# Patient Record
Sex: MALE | URBAN - METROPOLITAN AREA
[De-identification: names, ages, dates, MRNs, and addresses within clinical notes are randomized per-mention and may not be internally consistent; named-entity substitution may affect disease eponyms.]

---

## 2017-06-26 ENCOUNTER — APPOINTMENT (RX ONLY)
Dept: URBAN - METROPOLITAN AREA CLINIC 77 | Facility: CLINIC | Age: 57
Setting detail: DERMATOLOGY
End: 2017-06-26

## 2017-06-26 DIAGNOSIS — L70.0 ACNE VULGARIS: ICD-10-CM

## 2017-06-26 DIAGNOSIS — L72.0 EPIDERMAL CYST: ICD-10-CM

## 2017-06-26 PROCEDURE — ? PRESCRIPTION

## 2017-06-26 PROCEDURE — ? COUNSELING

## 2017-06-26 PROCEDURE — 99203 OFFICE O/P NEW LOW 30 MIN: CPT

## 2017-06-26 PROCEDURE — ? TREATMENT REGIMEN

## 2017-06-26 RX ORDER — TAZAROTENE 1 MG/G
AEROSOL, FOAM TOPICAL
Qty: 1 | Refills: 3 | Status: ERX | COMMUNITY
Start: 2017-06-26

## 2017-06-26 RX ADMIN — TAZAROTENE: 1 AEROSOL, FOAM TOPICAL at 21:31

## 2017-06-26 ASSESSMENT — LOCATION SIMPLE DESCRIPTION DERM
LOCATION SIMPLE: LEFT EAR
LOCATION SIMPLE: LEFT FOREHEAD

## 2017-06-26 ASSESSMENT — LOCATION DETAILED DESCRIPTION DERM
LOCATION DETAILED: LEFT MEDIAL FOREHEAD
LOCATION DETAILED: LEFT POSTERIOR EAR

## 2017-06-26 ASSESSMENT — LOCATION ZONE DERM
LOCATION ZONE: EAR
LOCATION ZONE: FACE

## 2017-06-26 NOTE — PROCEDURE: TREATMENT REGIMEN
Detail Level: Zone
Plan: Location: left retro auricle\\nDiscussed with him that it is a benign cystic growth we can extract today. Today we extracted it and has been resolved.
Plan: Location: face\\nPrescribe: Fabior 0.1% foam\\nPt presents with comedonal acne that he finds bothersome. Will start him on Fabior foam to see if it reduces whiteheads/blackheads

## 2019-06-11 ENCOUNTER — APPOINTMENT (RX ONLY)
Dept: URBAN - METROPOLITAN AREA CLINIC 77 | Facility: CLINIC | Age: 59
Setting detail: DERMATOLOGY
End: 2019-06-11

## 2019-06-11 DIAGNOSIS — L21.8 OTHER SEBORRHEIC DERMATITIS: ICD-10-CM

## 2019-06-11 PROCEDURE — 99213 OFFICE O/P EST LOW 20 MIN: CPT

## 2019-06-11 PROCEDURE — ? COUNSELING

## 2019-06-11 PROCEDURE — ? TREATMENT REGIMEN

## 2019-06-11 ASSESSMENT — LOCATION DETAILED DESCRIPTION DERM: LOCATION DETAILED: LEFT NASAL ALA

## 2019-06-11 ASSESSMENT — LOCATION ZONE DERM: LOCATION ZONE: NOSE

## 2019-06-11 ASSESSMENT — LOCATION SIMPLE DESCRIPTION DERM: LOCATION SIMPLE: LEFT NOSE

## 2019-06-11 NOTE — PROCEDURE: TREATMENT REGIMEN
Samples Given: Vytone 1.9%-1% topical cream packet
Plan: Location: left nasal ala\\nSamples given Vytone 1.9%-1% topical cream packets \\n**Patient is to call in two weeks with update**\\n\\n\\nPatient presents today with a rough patch of skin on his left nasal ala \\nHe states he noticed the textured change about a month ago \\nDiscussed as the human body heals, inflammation becomes a response to stress. Like stress, inflammation is beneficial, although when stress becomes chronic, it can lead to constant tissue breakdown and impairment of the immune system.\\n\\nToday i will be giving the patient of Vytone 1.9%-1% topical cream packets to use nightly for two weeks, patient instructed if there is improvement then he is to ask for a prescription to be sent in to [facility]\\nHowever if there is no improvement patient instructed to make an appointment to come in to have a biopsy performed so that we can get a better understanding of whats causing texture on left nasal ala to feel different.\\n\\nPatient is to follow up as needed
Detail Level: Zone